# Patient Record
Sex: MALE | Race: OTHER | NOT HISPANIC OR LATINO | URBAN - METROPOLITAN AREA
[De-identification: names, ages, dates, MRNs, and addresses within clinical notes are randomized per-mention and may not be internally consistent; named-entity substitution may affect disease eponyms.]

---

## 2018-01-28 ENCOUNTER — EMERGENCY (EMERGENCY)
Facility: HOSPITAL | Age: 63
LOS: 1 days | Discharge: HOME | End: 2018-01-28
Attending: EMERGENCY MEDICINE | Admitting: EMERGENCY MEDICINE
Payer: COMMERCIAL

## 2018-01-28 VITALS
OXYGEN SATURATION: 97 % | SYSTOLIC BLOOD PRESSURE: 158 MMHG | DIASTOLIC BLOOD PRESSURE: 86 MMHG | TEMPERATURE: 102 F | HEART RATE: 108 BPM | RESPIRATION RATE: 18 BRPM

## 2018-01-28 VITALS — TEMPERATURE: 100 F

## 2018-01-28 LAB
ALBUMIN SERPL ELPH-MCNC: 4 G/DL — SIGNIFICANT CHANGE UP (ref 3.3–5)
ALP SERPL-CCNC: 56 U/L — SIGNIFICANT CHANGE UP (ref 40–120)
ALT FLD-CCNC: 21 U/L — SIGNIFICANT CHANGE UP (ref 4–41)
AST SERPL-CCNC: 30 U/L — SIGNIFICANT CHANGE UP (ref 4–40)
BASE EXCESS BLDV CALC-SCNC: 1.7 MMOL/L — SIGNIFICANT CHANGE UP
BASOPHILS # BLD AUTO: 0.01 K/UL — SIGNIFICANT CHANGE UP (ref 0–0.2)
BASOPHILS NFR BLD AUTO: 0.2 % — SIGNIFICANT CHANGE UP (ref 0–2)
BASOPHILS NFR SPEC: 0 % — SIGNIFICANT CHANGE UP (ref 0–2)
BILIRUB SERPL-MCNC: 0.3 MG/DL — SIGNIFICANT CHANGE UP (ref 0.2–1.2)
BLOOD GAS VENOUS - CREATININE: 1 MG/DL — SIGNIFICANT CHANGE UP (ref 0.5–1.3)
BUN SERPL-MCNC: 11 MG/DL — SIGNIFICANT CHANGE UP (ref 7–23)
CALCIUM SERPL-MCNC: 8.4 MG/DL — SIGNIFICANT CHANGE UP (ref 8.4–10.5)
CHLORIDE BLDV-SCNC: 103 MMOL/L — SIGNIFICANT CHANGE UP (ref 96–108)
CHLORIDE SERPL-SCNC: 99 MMOL/L — SIGNIFICANT CHANGE UP (ref 98–107)
CK MB BLD-MCNC: 1 NG/ML — SIGNIFICANT CHANGE UP (ref 1–6.6)
CK MB BLD-MCNC: 1.06 NG/ML — SIGNIFICANT CHANGE UP (ref 1–6.6)
CK MB BLD-MCNC: SIGNIFICANT CHANGE UP (ref 0–2.5)
CK SERPL-CCNC: 131 U/L — SIGNIFICANT CHANGE UP (ref 30–200)
CK SERPL-CCNC: 149 U/L — SIGNIFICANT CHANGE UP (ref 30–200)
CO2 SERPL-SCNC: 24 MMOL/L — SIGNIFICANT CHANGE UP (ref 22–31)
CREAT SERPL-MCNC: 1.17 MG/DL — SIGNIFICANT CHANGE UP (ref 0.5–1.3)
EOSINOPHIL # BLD AUTO: 0 K/UL — SIGNIFICANT CHANGE UP (ref 0–0.5)
EOSINOPHIL NFR BLD AUTO: 0 % — SIGNIFICANT CHANGE UP (ref 0–6)
EOSINOPHIL NFR FLD: 0 % — SIGNIFICANT CHANGE UP (ref 0–6)
GAS PNL BLDV: 136 MMOL/L — SIGNIFICANT CHANGE UP (ref 136–146)
GLUCOSE BLDV-MCNC: 113 — HIGH (ref 70–99)
GLUCOSE SERPL-MCNC: 120 MG/DL — HIGH (ref 70–99)
HCO3 BLDV-SCNC: 24 MMOL/L — SIGNIFICANT CHANGE UP (ref 20–27)
HCT VFR BLD CALC: 43.3 % — SIGNIFICANT CHANGE UP (ref 39–50)
HCT VFR BLDV CALC: 45.2 % — SIGNIFICANT CHANGE UP (ref 39–51)
HGB BLD-MCNC: 14.4 G/DL — SIGNIFICANT CHANGE UP (ref 13–17)
HGB BLDV-MCNC: 14.7 G/DL — SIGNIFICANT CHANGE UP (ref 13–17)
IMM GRANULOCYTES # BLD AUTO: 0.01 # — SIGNIFICANT CHANGE UP
IMM GRANULOCYTES NFR BLD AUTO: 0.2 % — SIGNIFICANT CHANGE UP (ref 0–1.5)
LACTATE BLDV-MCNC: 1.2 MMOL/L — SIGNIFICANT CHANGE UP (ref 0.5–2)
LG PLATELETS BLD QL AUTO: SLIGHT — SIGNIFICANT CHANGE UP
LYMPHOCYTES # BLD AUTO: 0.63 K/UL — LOW (ref 1–3.3)
LYMPHOCYTES # BLD AUTO: 11 % — LOW (ref 13–44)
LYMPHOCYTES NFR SPEC AUTO: 13 % — SIGNIFICANT CHANGE UP (ref 13–44)
MANUAL SMEAR VERIFICATION: SIGNIFICANT CHANGE UP
MCHC RBC-ENTMCNC: 30.6 PG — SIGNIFICANT CHANGE UP (ref 27–34)
MCHC RBC-ENTMCNC: 33.3 % — SIGNIFICANT CHANGE UP (ref 32–36)
MCV RBC AUTO: 92.1 FL — SIGNIFICANT CHANGE UP (ref 80–100)
MONOCYTES # BLD AUTO: 0.34 K/UL — SIGNIFICANT CHANGE UP (ref 0–0.9)
MONOCYTES NFR BLD AUTO: 5.9 % — SIGNIFICANT CHANGE UP (ref 2–14)
MONOCYTES NFR BLD: 4 % — SIGNIFICANT CHANGE UP (ref 2–9)
MORPHOLOGY BLD-IMP: SIGNIFICANT CHANGE UP
NEUTROPHIL AB SER-ACNC: 76 % — SIGNIFICANT CHANGE UP (ref 43–77)
NEUTROPHILS # BLD AUTO: 4.75 K/UL — SIGNIFICANT CHANGE UP (ref 1.8–7.4)
NEUTROPHILS NFR BLD AUTO: 82.7 % — HIGH (ref 43–77)
NEUTS BAND # BLD: 5 % — SIGNIFICANT CHANGE UP (ref 0–6)
NRBC # BLD: 0 /100WBC — SIGNIFICANT CHANGE UP
NRBC # FLD: 0 — SIGNIFICANT CHANGE UP
OTHER - HEMATOLOGY %: 2 — SIGNIFICANT CHANGE UP
PCO2 BLDV: 45 MMHG — SIGNIFICANT CHANGE UP (ref 41–51)
PH BLDV: 7.38 PH — SIGNIFICANT CHANGE UP (ref 7.32–7.43)
PLATELET # BLD AUTO: 100 K/UL — LOW (ref 150–400)
PMV BLD: 10.9 FL — SIGNIFICANT CHANGE UP (ref 7–13)
PO2 BLDV: 26 MMHG — LOW (ref 35–40)
POTASSIUM BLDV-SCNC: 4.2 MMOL/L — SIGNIFICANT CHANGE UP (ref 3.4–4.5)
POTASSIUM SERPL-MCNC: 4.7 MMOL/L — SIGNIFICANT CHANGE UP (ref 3.5–5.3)
POTASSIUM SERPL-SCNC: 4.7 MMOL/L — SIGNIFICANT CHANGE UP (ref 3.5–5.3)
PROT SERPL-MCNC: 7.1 G/DL — SIGNIFICANT CHANGE UP (ref 6–8.3)
RBC # BLD: 4.7 M/UL — SIGNIFICANT CHANGE UP (ref 4.2–5.8)
RBC # FLD: 12.1 % — SIGNIFICANT CHANGE UP (ref 10.3–14.5)
SAO2 % BLDV: 47.1 % — LOW (ref 60–85)
SODIUM SERPL-SCNC: 136 MMOL/L — SIGNIFICANT CHANGE UP (ref 135–145)
TROPONIN T SERPL-MCNC: < 0.06 NG/ML — SIGNIFICANT CHANGE UP (ref 0–0.06)
TROPONIN T SERPL-MCNC: < 0.06 NG/ML — SIGNIFICANT CHANGE UP (ref 0–0.06)
WBC # BLD: 5.74 K/UL — SIGNIFICANT CHANGE UP (ref 3.8–10.5)
WBC # FLD AUTO: 5.74 K/UL — SIGNIFICANT CHANGE UP (ref 3.8–10.5)

## 2018-01-28 PROCEDURE — 71046 X-RAY EXAM CHEST 2 VIEWS: CPT | Mod: 26

## 2018-01-28 PROCEDURE — 99285 EMERGENCY DEPT VISIT HI MDM: CPT

## 2018-01-28 RX ORDER — SODIUM CHLORIDE 9 MG/ML
1000 INJECTION INTRAMUSCULAR; INTRAVENOUS; SUBCUTANEOUS ONCE
Qty: 0 | Refills: 0 | Status: COMPLETED | OUTPATIENT
Start: 2018-01-28 | End: 2018-01-28

## 2018-01-28 RX ORDER — ACETAMINOPHEN 500 MG
650 TABLET ORAL ONCE
Qty: 0 | Refills: 0 | Status: COMPLETED | OUTPATIENT
Start: 2018-01-28 | End: 2018-01-28

## 2018-01-28 RX ORDER — ACETAMINOPHEN 500 MG
975 TABLET ORAL ONCE
Qty: 0 | Refills: 0 | Status: COMPLETED | OUTPATIENT
Start: 2018-01-28 | End: 2018-01-28

## 2018-01-28 RX ADMIN — Medication 975 MILLIGRAM(S): at 15:56

## 2018-01-28 RX ADMIN — Medication 650 MILLIGRAM(S): at 20:24

## 2018-01-28 RX ADMIN — SODIUM CHLORIDE 1000 MILLILITER(S): 9 INJECTION INTRAMUSCULAR; INTRAVENOUS; SUBCUTANEOUS at 15:56

## 2018-01-28 NOTE — ED ADULT TRIAGE NOTE - CHIEF COMPLAINT QUOTE
Co fevers, chills, nausea, body aches x 3 days. Temp 102.1 in triage, denies taking any Tylenol or ibuprofen. Taking day quil and nyquil with no relief.  Also co abdominal pain since April 2017. Colonoscopy/ endoscopy done in August and told he had pylori and treated for it. Had a CT of abdomen done on Wednesday but has not received results yet. Denies pmh.

## 2018-01-28 NOTE — ED ADULT NURSE REASSESSMENT NOTE - NS ED NURSE REASSESS COMMENT FT1
RN Break Coverage: received report on pt. pt presents awake a&ox4, denies dizziness or ha. skin warm, dry, appropriate for race. respirations even unlabored. denies cp or sob. abdomen soft nontender nondistended. denies n/v/d. +fever/chills. IVL site clean, dry, intact, patent. NS0.9% bolus infusing. family at bedside. pending disposition.

## 2018-01-28 NOTE — ED PROVIDER NOTE - OBJECTIVE STATEMENT
62yM hx H.pylori infxn (diagnosed last year) p/w fever and cough x 4d. No rhinorrhea, congestion, sorethroat, body aches, or other symptoms. Pt syncopized in ED waiting room today. LOC x10sec. No head trauma, was sitting in chair and slumped. Urinated on himself while unconscious. No fecal incontinence or tongue biting. Pt has never syncopized in past. No confusion upon awakening. Prior to syncopizing pt felt weak, lightheaded, nauseous, and diaphoretic.

## 2018-01-28 NOTE — ED PROVIDER NOTE - ATTENDING CONTRIBUTION TO CARE
Dr. Cardenas:  I have personally performed a face to face bedside history and physical examination of this patient. I have discussed the history, examination, review of systems, assessment and plan of management with the resident. I have reviewed the electronic medical record and amended it to reflect my history, review of systems, physical exam, assessment and plan.    62M h/o H pylori s/p treatment, recent abd fullness s/p outpatient CT (since improved, awaiting CT results), presents for cough x 3 days.  Had episode in waiting room in which case he felt lightheaded then syncopized with small amount of urinary incontinence.  Denies fever/chills, cp, sob, n/v/d.    Exam:  - nad  - rrr  - ctab  - abd soft ntnd  - no pedal edema    A/P  - likely viral syndrome, given syncope r/o acs  - cbc, cmp, trop, vbg  - cxr  - ekg

## 2018-01-28 NOTE — ED PROVIDER NOTE - PROGRESS NOTE DETAILS
Autumn: Case was s/o to me by the day team. Delta trop and d/c.   Update: Pt's temp went down, pt felt better and ready to go home. No chest pain.

## 2018-01-29 LAB
SPECIMEN SOURCE: SIGNIFICANT CHANGE UP
SPECIMEN SOURCE: SIGNIFICANT CHANGE UP

## 2018-02-02 LAB
BACTERIA BLD CULT: SIGNIFICANT CHANGE UP
BACTERIA BLD CULT: SIGNIFICANT CHANGE UP

## 2023-01-30 ENCOUNTER — OFFICE (OUTPATIENT)
Dept: URBAN - METROPOLITAN AREA CLINIC 92 | Facility: CLINIC | Age: 68
Setting detail: OPHTHALMOLOGY
End: 2023-01-30
Payer: COMMERCIAL

## 2023-01-30 DIAGNOSIS — H01.001: ICD-10-CM

## 2023-01-30 DIAGNOSIS — H01.005: ICD-10-CM

## 2023-01-30 DIAGNOSIS — H52.4: ICD-10-CM

## 2023-01-30 DIAGNOSIS — H01.004: ICD-10-CM

## 2023-01-30 DIAGNOSIS — H01.002: ICD-10-CM

## 2023-01-30 DIAGNOSIS — H25.13: ICD-10-CM

## 2023-01-30 PROBLEM — H52.7 REFRACTIVE ERROR ; BOTH EYES: Status: ACTIVE | Noted: 2023-01-30

## 2023-01-30 PROCEDURE — 92015 DETERMINE REFRACTIVE STATE: CPT | Performed by: SPECIALIST

## 2023-01-30 PROCEDURE — 92004 COMPRE OPH EXAM NEW PT 1/>: CPT | Performed by: SPECIALIST

## 2023-01-30 ASSESSMENT — AXIALLENGTH_DERIVED
OD_AL: 22.4304
OS_AL: 21.9419
OS_AL: 22.6373
OS_AL: 22.548

## 2023-01-30 ASSESSMENT — VISUAL ACUITY
OS_BCVA: 20/25-1
OD_BCVA: 20/50-1

## 2023-01-30 ASSESSMENT — REFRACTION_CURRENTRX
OS_AXIS: 65
OS_OVR_VA: 20/
OD_OVR_VA: 20/
OD_SPHERE: +2.75
OD_CYLINDER: +0.50
OS_CYLINDER: +0.50
OS_SPHERE: +2.75
OD_AXIS: 018

## 2023-01-30 ASSESSMENT — REFRACTION_AUTOREFRACTION
OS_SPHERE: +1.25
OD_AXIS: 018
OS_CYLINDER: +0.75
OD_SPHERE: +1.00
OS_AXIS: 147
OD_CYLINDER: +0.50

## 2023-01-30 ASSESSMENT — KERATOMETRY
OS_K1POWER_DIOPTERS: 44.50
OD_K1POWER_DIOPTERS: 45.25
OD_K2POWER_DIOPTERS: 45.75
METHOD_AUTO_MANUAL: AUTO
OS_K2POWER_DIOPTERS: 45.00
OD_AXISANGLE_DEGREES: 072
OS_AXISANGLE_DEGREES: 119

## 2023-01-30 ASSESSMENT — CONFRONTATIONAL VISUAL FIELD TEST (CVF)
OS_FINDINGS: FULL
OD_FINDINGS: FULL

## 2023-01-30 ASSESSMENT — REFRACTION_MANIFEST
OS_SPHERE: +1.00
OS_SPHERE: +3.00
OD_ADD: +2.00
OD_CYLINDER: SPH
OS_AXIS: 125
OS_CYLINDER: +0.75
OS_ADD: +2.00
OD_SPHERE: +1.00
OS_CYLINDER: +0.75
OS_AXIS: 125
OD_SPHERE: +3.00

## 2023-01-30 ASSESSMENT — SPHEQUIV_DERIVED
OD_SPHEQUIV: 1.25
OS_SPHEQUIV: 1.625
OS_SPHEQUIV: 3.375
OS_SPHEQUIV: 1.375

## 2023-01-30 ASSESSMENT — TONOMETRY
OS_IOP_MMHG: 15
OD_IOP_MMHG: 15

## 2023-01-30 ASSESSMENT — LID EXAM ASSESSMENTS
OS_BLEPHARITIS: LLL LUL T
OD_BLEPHARITIS: RLL RUL T

## 2025-07-21 ENCOUNTER — OFFICE (OUTPATIENT)
Dept: URBAN - METROPOLITAN AREA CLINIC 92 | Facility: CLINIC | Age: 70
Setting detail: OPHTHALMOLOGY
End: 2025-07-21
Payer: COMMERCIAL

## 2025-07-21 DIAGNOSIS — H01.004: ICD-10-CM

## 2025-07-21 DIAGNOSIS — H01.005: ICD-10-CM

## 2025-07-21 DIAGNOSIS — H01.001: ICD-10-CM

## 2025-07-21 DIAGNOSIS — H52.03: ICD-10-CM

## 2025-07-21 DIAGNOSIS — H01.002: ICD-10-CM

## 2025-07-21 PROCEDURE — 92014 COMPRE OPH EXAM EST PT 1/>: CPT | Performed by: SPECIALIST

## 2025-07-21 PROCEDURE — 92015 DETERMINE REFRACTIVE STATE: CPT | Performed by: SPECIALIST

## 2025-07-21 ASSESSMENT — VISUAL ACUITY
OD_BCVA: 20/25-1
OS_BCVA: 20/30-2

## 2025-07-21 ASSESSMENT — TONOMETRY
OD_IOP_MMHG: 14
OS_IOP_MMHG: 14

## 2025-07-21 ASSESSMENT — REFRACTION_MANIFEST
OS_SPHERE: +3.00
OD_ADD: +2.00
OS_AXIS: 043
OS_CYLINDER: +0.75
OS_CYLINDER: -0.75
OS_CYLINDER: +0.75
OD_SPHERE: +1.25
OD_SPHERE: +1.00
OS_AXIS: 043
OS_ADD: +2.00
OS_ADD: +2.00
OS_SPHERE: +1.00
OS_SPHERE: +1.75
OS_AXIS: 125
OS_SPHERE: +3.75
OD_CYLINDER: SPH
OD_ADD: +2.00
OS_CYLINDER: -0.75
OD_CYLINDER: SPH
OD_SPHERE: +3.00
OD_SPHERE: +3.25
OS_AXIS: 125
OD_CYLINDER: SPH

## 2025-07-21 ASSESSMENT — REFRACTION_CURRENTRX
OD_AXIS: 018
OS_AXIS: 65
OD_OVR_VA: 20/
OS_CYLINDER: +0.50
OS_OVR_VA: 20/
OD_CYLINDER: +0.50
OS_SPHERE: +2.75
OD_SPHERE: +2.75

## 2025-07-21 ASSESSMENT — REFRACTION_AUTOREFRACTION
OD_SPHERE: +1.00
OS_CYLINDER: +0.75
OS_SPHERE: +1.25
OD_AXIS: 020
OS_AXIS: 141
OD_CYLINDER: +0.50

## 2025-07-21 ASSESSMENT — CONFRONTATIONAL VISUAL FIELD TEST (CVF)
OS_FINDINGS: FULL
OD_FINDINGS: FULL

## 2025-07-21 ASSESSMENT — LID EXAM ASSESSMENTS
OD_BLEPHARITIS: RLL RUL T
OS_BLEPHARITIS: LLL LUL T

## 2025-07-21 ASSESSMENT — KERATOMETRY
OS_AXISANGLE_DEGREES: 099
OD_K2POWER_DIOPTERS: 45.75
OS_K2POWER_DIOPTERS: 5.50
OD_K1POWER_DIOPTERS: 45.50
OS_K1POWER_DIOPTERS: 45.00
METHOD_AUTO_MANUAL: AUTO
OD_AXISANGLE_DEGREES: 077